# Patient Record
Sex: FEMALE | Race: WHITE
[De-identification: names, ages, dates, MRNs, and addresses within clinical notes are randomized per-mention and may not be internally consistent; named-entity substitution may affect disease eponyms.]

---

## 2018-07-25 ENCOUNTER — HOSPITAL ENCOUNTER (OUTPATIENT)
Dept: HOSPITAL 80 - FCATH | Age: 83
Discharge: HOME | End: 2018-07-25
Attending: INTERNAL MEDICINE
Payer: COMMERCIAL

## 2018-07-25 DIAGNOSIS — Z79.01: ICD-10-CM

## 2018-07-25 DIAGNOSIS — I34.0: ICD-10-CM

## 2018-07-25 DIAGNOSIS — I48.1: Primary | ICD-10-CM

## 2018-07-25 DIAGNOSIS — I10: ICD-10-CM

## 2018-07-25 DIAGNOSIS — E78.00: ICD-10-CM

## 2018-07-25 LAB
INR PPP: 1.44 (ref 0.83–1.16)
PROTHROMBIN TIME: 17.7 SEC (ref 12–15)

## 2018-07-25 PROCEDURE — 5A2204Z RESTORATION OF CARDIAC RHYTHM, SINGLE: ICD-10-PCS | Performed by: INTERNAL MEDICINE

## 2018-07-25 NOTE — PDANEPAE
ANE History of Present Illness





86 yo for vinod/cv





ANE Past Medical History





- Cardiovascular History


Hx Hypertension: Yes


Hx Arrhythmias: Yes


Hx Chest Pain: No


Hx Coronary Artery / Peripheral Vascular Disease: No


Hx CHF / Valvular Disease: Yes


Hx Palpitations: Yes





- Pulmonary History


Hx COPD: No


Hx Asthma/Reactive Airway Disease: No


Hx Recent Upper Respiratory Infection: No


Hx Oxygen in Use at Home: No


Hx Sleep Apnea: No





ANE Review of Systems


Review of Systems: 








- Exercise capacity


METS (RN): 3 METS





ANE Patient History





- Allergies


Allergies/Adverse Reactions: 








No Allergies [NKDA] Allergy (Verified 07/20/18 18:00)


 








- Home Medications


Home medications: home medication list seen and reviewed


Home Medications: 








Acetaminophen [Tylenol Extra Strength] 500 mg BID 07/25/18 [Last Taken 07/24/18]


Apixaban [Eliquis] 2.5 mg BID 07/25/18 [Last Taken 07/25/18 06:00]


Baclofen [Baclofen 10 mg (*)] 5 mg AC 07/25/18 [Last Taken Unknown]


Baclofen [Baclofen 10 mg (*)] 10 mg HS 07/25/18 [Last Taken Unknown]


Digoxin [Digox] 125 mcg DAILY 07/25/18 [Last Taken 07/25/18]


Diltiazem HCl [Diltiazem 24Hr ER] 240 mg DAILY 07/25/18 [Last Taken 07/25/18]


Rosuvastatin Calcium 5 mg DAILY 07/25/18 [Last Taken 07/25/18]








- NPO status


NPO Status: no food or drink >8 hours





- Anes Hx


Anes Hx: no prior problems





- Smoking Hx


Smoking Status: Never smoked





ANE Labs/Vital Signs





- Labs


Result Diagrams: 


 07/25/18 07:35





- Vital Signs


Height: 5 ft


Weight: 49.895 kg





ANE Physical Exam





- Airway


Neck exam: FROM


Mallampati Score: Class 2


Mouth exam: normal dental/mouth exam





- Pulmonary


Pulmonary: no respiratory distress





- Cardiovascular


Cardiovascular: regular rate and rhythym





- ASA Status


ASA Status: II





ANE Anesthesia Plan


Anesthesia Plan: GA with mask

## 2018-07-25 NOTE — PDTEE1
ARELIS Cardioversion Procedure


Procedure: electrical cardioversion, transesophageal echo


Indications: atrial fibrillation


Consent: signed and in chart


Anticoagulation: eliquis


Procedural Details: 


Pads were placed in anterior-posterior position.  ARELIS probe was advanced and 

standard images obtained.  There is no evidence of left atrial or left atrial 

appendage thrombus.





Synchronized cardioversion attempt #1: 200J


Results: normal sinus rhythm


Conclusions: successful cardioversion

## 2018-07-30 ENCOUNTER — HOSPITAL ENCOUNTER (OUTPATIENT)
Dept: HOSPITAL 80 - BHFA | Age: 83
End: 2018-07-30
Attending: INTERNAL MEDICINE
Payer: COMMERCIAL

## 2018-07-30 DIAGNOSIS — I48.91: Primary | ICD-10-CM

## 2018-07-30 PROCEDURE — A9500 TC99M SESTAMIBI: HCPCS

## 2018-07-30 PROCEDURE — 93017 CV STRESS TEST TRACING ONLY: CPT

## 2018-07-30 PROCEDURE — 78452 HT MUSCLE IMAGE SPECT MULT: CPT

## 2018-12-06 ENCOUNTER — HOSPITAL ENCOUNTER (OUTPATIENT)
Dept: HOSPITAL 80 - BHLMT | Age: 83
End: 2018-12-06
Attending: INTERNAL MEDICINE
Payer: COMMERCIAL

## 2018-12-06 DIAGNOSIS — I48.91: Primary | ICD-10-CM

## 2023-08-30 NOTE — ECHO
[email or web address]





[address] [address] 

Main: [phone] 



Fax: 



Transesophageal Echocardiography 

Name:            NEVIN JUAREZ                     MR#:

[id number]

Study Date:      07/25/2018                           Study Time:

09:14 AM

YOB: 1933                           Age:

85 year(s)

Height:          152.4 cm (60 in.)                    Weight:

49.9 kg (110 lb.)

BSA:             1.45 m2                              Gender:

Female

Examination:     ARELIS                                  Indication:

Atrial Fibrillation

Image Quality:                                        Contrast: 

Requested by:     Jake Ghotra  

Heart Rate:                                           Rhythm: 

BP:                / 



Procedure Staff 

Ultrasound Technician:   Jahaira Hook [facility] 

Reading Physician:       Jamison Gar MD 

Requesting Provider: 



ARELIS Exam Details 



Conclusions:          Normal global systolic LV function.  

An agitated saline study was performed and was negative for

intracardiac shunting.

No thrombus in left appendage.  

Spontaneous contrast noted in JEFFRY..  

Moderate mitral valve leaflet calcification is present.  

Mild mitral valve regurgitation is present.  

The aortic valve is tri-leaflet.  

Trivial aortic valve regurgitation.  

Lambl's excrescences noted.. 







Measurements: 

Chambers                   Valvular Assessment AV/MV          Valvular

Assessment TV/PV



Normal                                Normal

Normal

Name         Value   Range             Name        Value Range

Name          Value Range



Additional Measurements: 



Findings:             Left Ventricle: 

Normal global systolic LV function.  

Left Atrium: 

An agitated saline study was performed and was negative for

intracardiac shunting.

Left Atrial Appendage: 



Patient: NEVIN JUAREZ                   MRN: [id number]

Study Date: 07/25/2018   Page 1 of 2

09:14 AM 









No thrombus in left appendage. Spontaneous contrast noted in JEFFRY..  

Mitral Valve: 

Moderate mitral valve leaflet calcification is present. Mild mitral

valve regurgitation is present.

Aortic Valve: 

The aortic valve is tri-leaflet. Trivial aortic valve regurgitation.

Lambl's excrescences noted..



l1n 



Electronically signed by Jamison Gar MD on 07/25/2018 at 05:17 PM 

(No Signature Object) 



Patient: NEVIN JUAREZ                   MRN: [id number]

Study Date: 07/25/2018   Page 2 of 2

09:14 AM 







[id number]
no